# Patient Record
Sex: FEMALE | Race: WHITE
[De-identification: names, ages, dates, MRNs, and addresses within clinical notes are randomized per-mention and may not be internally consistent; named-entity substitution may affect disease eponyms.]

---

## 2020-08-08 ENCOUNTER — HOSPITAL ENCOUNTER (INPATIENT)
Dept: HOSPITAL 35 - ER | Age: 85
LOS: 2 days | Discharge: NURSING, REHAB OR LONG-TERM CARE | DRG: 65 | End: 2020-08-10
Attending: HOSPITALIST | Admitting: HOSPITALIST
Payer: COMMERCIAL

## 2020-08-08 VITALS — SYSTOLIC BLOOD PRESSURE: 171 MMHG | DIASTOLIC BLOOD PRESSURE: 67 MMHG

## 2020-08-08 VITALS — HEIGHT: 62.99 IN | BODY MASS INDEX: 24.36 KG/M2 | WEIGHT: 137.5 LBS

## 2020-08-08 VITALS — DIASTOLIC BLOOD PRESSURE: 102 MMHG | SYSTOLIC BLOOD PRESSURE: 142 MMHG

## 2020-08-08 VITALS — SYSTOLIC BLOOD PRESSURE: 170 MMHG | DIASTOLIC BLOOD PRESSURE: 81 MMHG

## 2020-08-08 DIAGNOSIS — M54.12: ICD-10-CM

## 2020-08-08 DIAGNOSIS — E44.1: ICD-10-CM

## 2020-08-08 DIAGNOSIS — F32.9: ICD-10-CM

## 2020-08-08 DIAGNOSIS — K59.00: ICD-10-CM

## 2020-08-08 DIAGNOSIS — Z79.899: ICD-10-CM

## 2020-08-08 DIAGNOSIS — Z60.2: ICD-10-CM

## 2020-08-08 DIAGNOSIS — G47.00: ICD-10-CM

## 2020-08-08 DIAGNOSIS — E78.5: ICD-10-CM

## 2020-08-08 DIAGNOSIS — M62.84: ICD-10-CM

## 2020-08-08 DIAGNOSIS — Z79.82: ICD-10-CM

## 2020-08-08 DIAGNOSIS — I10: ICD-10-CM

## 2020-08-08 DIAGNOSIS — G81.91: ICD-10-CM

## 2020-08-08 DIAGNOSIS — I63.81: Primary | ICD-10-CM

## 2020-08-08 DIAGNOSIS — E11.65: ICD-10-CM

## 2020-08-08 DIAGNOSIS — Z88.8: ICD-10-CM

## 2020-08-08 LAB
ALBUMIN SERPL-MCNC: 3.3 G/DL (ref 3.4–5)
ALT SERPL-CCNC: 19 U/L (ref 30–65)
ANION GAP SERPL CALC-SCNC: 7 MMOL/L (ref 7–16)
APTT BLD: 27.9 SECONDS (ref 24.5–32.8)
AST SERPL-CCNC: 15 U/L (ref 15–37)
BASOPHILS NFR BLD AUTO: 0.7 % (ref 0–2)
BILIRUB SERPL-MCNC: 0.3 MG/DL (ref 0.2–1)
BUN SERPL-MCNC: 23 MG/DL (ref 7–18)
CALCIUM SERPL-MCNC: 8.9 MG/DL (ref 8.5–10.1)
CHLORIDE SERPL-SCNC: 100 MMOL/L (ref 98–107)
CO2 SERPL-SCNC: 29 MMOL/L (ref 21–32)
CREAT SERPL-MCNC: 0.8 MG/DL (ref 0.6–1)
EOSINOPHIL NFR BLD: 6.9 % (ref 0–3)
ERYTHROCYTE [DISTWIDTH] IN BLOOD BY AUTOMATED COUNT: 13.8 % (ref 10.5–14.5)
GLUCOSE SERPL-MCNC: 173 MG/DL (ref 74–106)
GRANULOCYTES NFR BLD MANUAL: 62.6 % (ref 36–66)
HCT VFR BLD CALC: 32.7 % (ref 37–47)
HGB BLD-MCNC: 11.1 GM/DL (ref 12–15)
INR PPP: 1.1
LYMPHOCYTES NFR BLD AUTO: 19.7 % (ref 24–44)
MAGNESIUM SERPL-MCNC: 2.1 MG/DL (ref 1.8–2.4)
MCH RBC QN AUTO: 32.1 PG (ref 26–34)
MCHC RBC AUTO-ENTMCNC: 34 G/DL (ref 28–37)
MCV RBC: 94.6 FL (ref 80–100)
MONOCYTES NFR BLD: 10.1 % (ref 1–8)
NEUTROPHILS # BLD: 3.7 THOU/UL (ref 1.4–8.2)
PLATELET # BLD: 269 THOU/UL (ref 150–400)
POTASSIUM SERPL-SCNC: 4.1 MMOL/L (ref 3.5–5.1)
PROT SERPL-MCNC: 6.7 G/DL (ref 6.4–8.2)
PROTHROMBIN TIME: 10.9 SECONDS (ref 9.3–11.4)
RBC # BLD AUTO: 3.45 MIL/UL (ref 4.2–5)
SODIUM SERPL-SCNC: 136 MMOL/L (ref 136–145)
TROPONIN I SERPL-MCNC: <0.06 NG/ML (ref ?–0.06)
WBC # BLD AUTO: 5.9 THOU/UL (ref 4–11)

## 2020-08-08 PROCEDURE — 10081 I&D PILONIDAL CYST COMP: CPT

## 2020-08-08 SDOH — SOCIAL STABILITY - SOCIAL INSECURITY: PROBLEMS RELATED TO LIVING ALONE: Z60.2

## 2020-08-08 NOTE — HC
[facility]
Meredith [address]
[place]   [address]                     CONSULTATION                  
_______________________________________________________________________________
 
Name:       PAZ MONTGOMERY              Room #:         204-P       [facility] [place]  
M.R.#:      [id number]                       Account #:      [id number]  
Admission:  08/08/20    Attend Phys:    Josiah Soto MD    
Discharge:  08/10/20    Date of Birth:  [birth year]  
                                                          Report #: [id number]
                                                                    [id number]   
_______________________________________________________________________________
THIS REPORT FOR:  
 
cc:  Godfrey Mtz MD,Willy Perez MD, MD                                         ~
CC: Josiah Mtz
 
DATE OF SERVICE:  08/10/2020
 
 
HISTORY OF PRESENT ILLNESS:  The patient is an 88-year-old white female who was
admitted with right-sided weakness, noted to be recurrent.  She also had a fall
backwards with injury to the back of her head.  She was admitted and underwent
further evaluation.  Workup is consistent with an acute CVA with right-sided
weakness.  She was noted to have 4 small CVAs, left thalamic as well as adjacent
white matter.  A prior history is a history of a cervical spinal cord injury
when she apparently fell off a bridge 25 years ago and she does have some
resultant right-sided weakness.  Her course has now been complicated by some
further weakness from this new stroke.  She also underwent an MRI scan of her
cervical spine by Neurology and it did reveal some severe neural foraminal
stenosis at C5-C6.  She also has the old thinning of the cervical spinal cord at
C6-C7 with some changes suggestive of myelomalacia.  With the new CVA
complicating a prior cervical spinal cord injury with residual paresis, the
patient is now being seen in Rehabilitation Medicine consultation.
 
PAST MEDICAL HISTORY:  Includes the prior cervical spinal cord injury.  She has
a history of back pain, depression, diabetes mellitus, and hypertension.
 
ALLERGIES:  MEPERIDINE.
 
MEDICATIONS:  Please see the full medication listing.
 
HABITS:  No history of tobacco abuse, alcohol only on special occasions.
 
SOCIAL HISTORY:  She has been living in an independent living apartment with her
caregiver.  The person was not really a caregiver, but more of a sitter.  The
daughter notes that she did assist with some bathing and also trying to ensure
that the patient did not fall.  The daughter indicates that the prior sitter is
not an option and they will need to get a different sitter or consider having
the patient move into an assisted living facility.  [facility]
 
REVIEW OF SYSTEMS:  No current complaints of chest pain, shortness of breath or
abdominal discomfort.
 
 
 
 
[address]   [address]                     CONSULTATION                  
_______________________________________________________________________________
 
Name:       PAZ MONTGOMERY              Room #:         204-P       [facility] [place]  
..#:      [id number]                       Account #:      [id number]  
Admission:  08/08/20    Attend Phys:    Josiah Soto MD    
Discharge:  08/10/20    Date of Birth:  [birth year]  
                                                          Report #: [id number]
                                                                    [id number]   
_______________________________________________________________________________
PHYSICAL EXAMINATION:
GENERAL:  An 88-year-old pleasant white female, in no obvious distress.  She is
here with her daughter in the room.  The patient is alert.
VITAL SIGNS:  Temperature 98.3, pulse 88, respirations 17, blood pressure
142/73.
HEENT:  Appeared to be benign.
NEUROLOGIC:  Cranial nerves are grossly intact.  Facies appeared symmetric. 
There is slight slurring of her speech, but overall can communicate quite well. 
Tends to defer some answers to her daughter.
EXTREMITIES:  She does have some decreased coordination of that right upper
extremity.  Strength is probably a grade 4-/5.  Right lower extremity appears
weaker, probably a grade 3+ with some difficulty with hip flexion and knee
extension, but this is noted to be old with a prior spinal cord injury.  Tone
was actually reasonably intact.  There was no clonus at the ankle and no
significant DTR at the knee.  Sensation appeared reasonably intact to
simultaneous stimulation except for some decreased sensation of her right third
digit, which she thought might be worse than premorbid.  Functionally, she has
been min assist sit to stand and gait short distances is min assist with a
front-wheeled walker.
 
ASSESSMENT:  An 88-year-old white female with the following problem list:
1.  Multiple small cerebrovascular accidents in the left cerebral hemisphere.
2.  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
3.  Right-sided weakness, some appears to be premorbid, now complicated by the
new strokes.
4.  Functional mobility and activities of daily living deficits and cognitive
concerns.
5.  Diabetes mellitus.
6.  Hypertension.
7.  Depression.
 
PLAN:  The patient is a candidate for an acute [address] [place] [address] inpatient rehabilitation
stay.  Can plan on transfer when medically cleared and a bed available.
 
Thank you for asking us to assist in this patient's care.
 
 
 
 
 
 
 
 
 
                         
   By:                               
                   
D: 08/10/20 1342                           _____________________________________
T: 08/10/20 2221                           Willy Shore MD           /SABRINA

## 2020-08-08 NOTE — HC
[facility]
Meredith [address]
[place]   [address]                     CONSULTATION                  
_______________________________________________________________________________
 
Name:       PAZ MONTGOMERY              Room #:         204-P       [facility] [place]  
M.R.#:      [id number]                       Account #:      [id number]  
Admission:  08/08/20    Attend Phys:    Josiah Soto MD    
Discharge:              Date of Birth:  [birth year]  
                                                          Report #: [id number]
                                                                    [id number]   
_______________________________________________________________________________
THIS REPORT FOR:  
 
cc:  Godfrey Mtz MD,Godfrey Gutierrez,Jeff SPANGLER MD                                         ~
CC: Josiah Mtz
 
DATE OF SERVICE:  08/08/2020
 
 
HISTORY OF PRESENT ILLNESS:  This is an 88-year-old female patient who was
evaluated by me for the possibility of stroke.  The patient was discussed with
the Emergency Room physician and subsequently I talked to the patient's daughter
multiple times and talked to the patient.  I talked to MRI and we got the MRI
done and I reviewed the MRI report and the films.
 
The patient was in [facility] on Tuesday with what looks like an
episode of speech difficulty and right-sided weakness from which she became
better.  She may not have returned completely back to the baseline, but overall
she was improved.  She had another episode yesterday and had another episode
today and in fact she may have had the fourth episode.  She is not completely
back to the baseline, but is close to that.  MRI films reviewed indicate that
she had an acute infarct.  There are small infarcts in the left cerebral
hemisphere, which will correlate with the patient's symptoms.  She had a slight
fall, but does not look like she injured her spine much, but she had an old
spine injury and so I got an MRI of the C-spine done and that shows
myelomalacia, which would be consistent with injury.  It complicates the things
because the patient has a preexisting weakness on the right side that is because
of spinal cord injury and now is having symptoms on the same side.
 
REVIEW OF SYSTEMS:  She is on multiple medications like Lexapro and
amitriptyline.  She is on aspirin.  She had some history of falls.  There had
been some concern about giving her blood thinner because of the falls.  She
still lives in independent living.
 
A 14-point review of systems is positive for above.
 
PAST MEDICAL HISTORY:  Positive for spinal cord injury long time ago.
 
FAMILY HISTORY:  Unremarkable.
 
SOCIAL HISTORY:  She is in assisted living and she has a very supporting
daughter.
 
PHYSICAL EXAMINATION:  She is alert, responsive, able to follow simple and
 
 
 
[facility]
[address]
[place]   [address]                     CONSULTATION                  
_______________________________________________________________________________
 
Name:       PAZ MONTGOMERY              Room #:         204-P       [facility] [place]  
.R.#:      [id number]                       Account #:      [id number]  
Admission:  08/08/20    Attend Phys:    Josiah Soto MD    
Discharge:              Date of Birth:  [birth year]  
                                                          Report #: [id number]
                                                                    [id number]   
_______________________________________________________________________________
complex command.  Her speech reasonably looked good now, but it was slurred
earlier.  She is weak on the right side, especially in the right leg, she barely
moves, but apparently that is old, but she can still appreciate the position
sense on both sides.  There is no meningeal sign.  Cardiac examination appears
unremarkable.  No respiratory difficulty was noticed.  I reviewed all her MRI
films and discussed the situation with the daughter and in fact showed her the
films.
 
IMPRESSION:
1.  Multiple small cerebrovascular accidents because of small vessel disease in
the left cerebral hemisphere.
2.  Spinal cord myelomalacia, but that is most likely old, although the patient
may be getting worse from that.
 
RECOMMENDATION:  I discussed the situation with the patient and the daughter.  I
discussed with them that the large vessel looks good and the stroke is most
likely because of small vessel disease.  Unfortunately, not much can be done
about that except antiplatelet therapy.  I discussed with them that it is the
nature of the stroke that they fluctuate for up to several days and can become
worse for several days and sometime the patient becomes completely paralyzed
before they start improving.  We have given her a loading dose of Plavix.  We
will continue about with a combination of aspirin and Plavix.  Daughter tells me
that she has a history of fall.  That does complicate the things because blood
thinner does increase her chances of complication from the fall as well as for
bleeding.  So after discussing things with her, my recommendation was to
continue combination of aspirin and Plavix when she is in the hospital and she
will have more help available.  Hopefully, after that she can go to rehab,
especially with her history of fall, I hope she will qualify because she needs
to be closely monitored and she came from independent living and falls can be
catastrophic in this patient and on the basis of that, I hope she will qualify
for rehabilitation.  If not, then I think we have to consider skilled nursing
facility because I do not think she should go to independent living for the time
being because of the history of the fall and I hope on the basis of that, she
will qualify to go to rehab or skilled nursing facility.  We will put a PT, OT
consult and a  consult to see if she qualifies.  We need to keep her
blood pressure somewhat high for a permissive hypertension and we need to give
her some fluids.  If she deteriorates, we will give her more fluids.  We will
get an echocardiogram done and as an outpatient, she may need prolonged
monitoring to look for atrial fibrillation.  If we find any atrial fibrillation
when she is on the monitor here, then I do not think she will need that
prolonged monitoring.  Those things will be decided next week.  Presently, I
think it is best to keep her in the hospital, give her a combination of aspirin
and Plavix, nurses should help her to prevent any falls and hopefully she will
qualify for the rehab and can go and spend some time in rehabilitation until she
can come back to her baseline and long-term use of aspirin and Plavix will be
decided later on in conjunction with discussion with the daughter and the
 
 
 
[facility]
[address]
[place]   [address]                     CONSULTATION                  
_______________________________________________________________________________
 
Name:       PAZ MONTGOMERY              Room #:         204-P       [facility] [place]  
..#:      [id number]                       Account #:      [id number]  
Admission:  08/08/20    Attend Phys:    Josiah Soto MD    
Discharge:              Date of Birth:  [birth year]  
                                                          Report #: [id number]
                                                                    [id number]   
_______________________________________________________________________________
patient herself.  After talking to daughter, I also talked to the patient and
described all those things with her and she is agreeable with that.
 
Approximately 50 minutes of time was spent taking care of this patient today and
majority of that time was spent counseling the daughter, the patient, reviewing
her films, reviewing her imaging studies of the brain.
 
Thank you very much for this referral and if you have any question, please feel
free to contact me.
 
 
 
 
 
 
 
 
 
 
 
 
 
 
 
 
 
 
 
 
 
 
 
 
 
 
 
 
 
 
 
 
 
 
 
                         
   By:                               
                   
D: 08/08/20 1548                           _____________________________________
T: 08/08/20 1714                           Jeff Gutierrez MD           /nt

## 2020-08-08 NOTE — NUR
PATIENT ARRIVED FROM ED VIA W/C, ALERT AND ORIENTED X4, ACCOMPANIED BY HER
DAUGHTER AND ED TECH.
ASSESSMENT AS DOCUMENTED, AND ADMISION COMPLETED.
/102, OTHER VSS AND NIHSS 0.
AND WILL CONTINUE WITH POC.

## 2020-08-09 VITALS — DIASTOLIC BLOOD PRESSURE: 83 MMHG | SYSTOLIC BLOOD PRESSURE: 162 MMHG

## 2020-08-09 VITALS — SYSTOLIC BLOOD PRESSURE: 153 MMHG | DIASTOLIC BLOOD PRESSURE: 86 MMHG

## 2020-08-09 VITALS — SYSTOLIC BLOOD PRESSURE: 157 MMHG | DIASTOLIC BLOOD PRESSURE: 76 MMHG

## 2020-08-09 VITALS — SYSTOLIC BLOOD PRESSURE: 147 MMHG | DIASTOLIC BLOOD PRESSURE: 77 MMHG

## 2020-08-09 VITALS — DIASTOLIC BLOOD PRESSURE: 80 MMHG | SYSTOLIC BLOOD PRESSURE: 165 MMHG

## 2020-08-09 LAB
ANION GAP SERPL CALC-SCNC: 9 MMOL/L (ref 7–16)
BILIRUB UR-MCNC: NEGATIVE MG/DL
BUN SERPL-MCNC: 16 MG/DL (ref 7–18)
CALCIUM SERPL-MCNC: 8.7 MG/DL (ref 8.5–10.1)
CHLORIDE SERPL-SCNC: 100 MMOL/L (ref 98–107)
CO2 SERPL-SCNC: 26 MMOL/L (ref 21–32)
COLOR UR: YELLOW
CREAT SERPL-MCNC: 0.8 MG/DL (ref 0.6–1)
ERYTHROCYTE [DISTWIDTH] IN BLOOD BY AUTOMATED COUNT: 14.1 % (ref 10.5–14.5)
GLUCOSE SERPL-MCNC: 142 MG/DL (ref 74–106)
HCT VFR BLD CALC: 33.7 % (ref 37–47)
HGB BLD-MCNC: 11.5 GM/DL (ref 12–15)
KETONES UR STRIP-MCNC: NEGATIVE MG/DL
MCH RBC QN AUTO: 32.3 PG (ref 26–34)
MCHC RBC AUTO-ENTMCNC: 34 G/DL (ref 28–37)
MCV RBC: 94.9 FL (ref 80–100)
PLATELET # BLD: 281 THOU/UL (ref 150–400)
POTASSIUM SERPL-SCNC: 3.9 MMOL/L (ref 3.5–5.1)
RBC # BLD AUTO: 3.55 MIL/UL (ref 4.2–5)
RBC # UR STRIP: NEGATIVE /UL
SODIUM SERPL-SCNC: 135 MMOL/L (ref 136–145)
SP GR UR STRIP: 1.01 (ref 1–1.03)
URINE CLARITY: CLEAR
URINE GLUCOSE-RANDOM*: NEGATIVE
URINE LEUKOCYTES-REFLEX: (no result)
URINE NITRITE-REFLEX: NEGATIVE
URINE PROTEIN (DIPSTICK): NEGATIVE
UROBILINOGEN UR STRIP-ACNC: 0.2 E.U./DL (ref 0.2–1)
WBC # BLD AUTO: 6.4 THOU/UL (ref 4–11)

## 2020-08-09 NOTE — NUR
ASSESSMENT AS DOCUMENTED.PT BEEN RESTING IN NO ACUTE DISTRESS.A/OX4.VSS.PT
REPORTED THAT SHE NOTED BLOOD IN HER COMMOND AFTER USING THE BSC,RN
CHECKED NO SIGNS OF BLEEDING.RECTAL AREA WAS INSPECTED,NO SIGN OF BLOOD OR
ACTIVE BLEEDING NOTED.DR PHILIP DISCONTINUED LOVENOX AND ADVISED TO MONITOR FOR
BLEEDING.NO DROP IN HGB NOTED.UP WITH ASSIST TO TOILET.PT DENIES PAIN OR ANY
DISTRESS AT THIS TIME.POC IS TO CONT WITH TX WITH POSSIBLE DISCHARGE TO REHAB
IN 1-2 DAYS.

## 2020-08-09 NOTE — NUR
ASSUMED CARE AT SHIFT, ALERT AND ORIENTED X4. -167/76-80, OTHER VSS AND
NSR ON THE MONITOR. DENIES NAY DISCOMFORT. CALL APPROPRIATLY FOR ASSISTANCE.
PROGRESSING TOWARDS THE GOALS AND WILL CONTINUE WITH POC.

## 2020-08-10 ENCOUNTER — HOSPITAL ENCOUNTER (INPATIENT)
Dept: HOSPITAL 35 - REHABU | Age: 85
LOS: 15 days | Discharge: HOME HEALTH | DRG: 56 | End: 2020-08-25
Attending: PHYSICAL MEDICINE & REHABILITATION | Admitting: PHYSICAL MEDICINE & REHABILITATION
Payer: COMMERCIAL

## 2020-08-10 VITALS — DIASTOLIC BLOOD PRESSURE: 74 MMHG | SYSTOLIC BLOOD PRESSURE: 158 MMHG

## 2020-08-10 VITALS — DIASTOLIC BLOOD PRESSURE: 74 MMHG | SYSTOLIC BLOOD PRESSURE: 154 MMHG

## 2020-08-10 VITALS — SYSTOLIC BLOOD PRESSURE: 142 MMHG | DIASTOLIC BLOOD PRESSURE: 73 MMHG

## 2020-08-10 VITALS — BODY MASS INDEX: 19.61 KG/M2 | HEIGHT: 62.99 IN | WEIGHT: 110.7 LBS

## 2020-08-10 VITALS — SYSTOLIC BLOOD PRESSURE: 145 MMHG | DIASTOLIC BLOOD PRESSURE: 90 MMHG

## 2020-08-10 VITALS — SYSTOLIC BLOOD PRESSURE: 166 MMHG | DIASTOLIC BLOOD PRESSURE: 86 MMHG

## 2020-08-10 DIAGNOSIS — Z20.828: ICD-10-CM

## 2020-08-10 DIAGNOSIS — I63.9: ICD-10-CM

## 2020-08-10 DIAGNOSIS — G95.89: ICD-10-CM

## 2020-08-10 DIAGNOSIS — Z79.899: ICD-10-CM

## 2020-08-10 DIAGNOSIS — F32.9: ICD-10-CM

## 2020-08-10 DIAGNOSIS — I69.351: Primary | ICD-10-CM

## 2020-08-10 DIAGNOSIS — K59.00: ICD-10-CM

## 2020-08-10 DIAGNOSIS — E11.9: ICD-10-CM

## 2020-08-10 DIAGNOSIS — Z79.82: ICD-10-CM

## 2020-08-10 DIAGNOSIS — R41.0: ICD-10-CM

## 2020-08-10 DIAGNOSIS — R41.9: ICD-10-CM

## 2020-08-10 DIAGNOSIS — I10: ICD-10-CM

## 2020-08-10 DIAGNOSIS — Z88.8: ICD-10-CM

## 2020-08-10 LAB
CHOLEST SERPL-MCNC: 197 MG/DL (ref ?–200)
HDLC SERPL-MCNC: 59 MG/DL (ref 40–?)
LDLC SERPL-MCNC: 123 MG/DL (ref ?–100)
TC:HDL: 3.3 RATIO
TRIGL SERPL-MCNC: 78 MG/DL (ref ?–150)
VLDLC SERPL CALC-MCNC: 16 MG/DL (ref ?–40)

## 2020-08-10 PROCEDURE — 10112: CPT

## 2020-08-10 NOTE — EKG
[facility]
[place]   [address]                     ELECTROCARDIOGRAM REPORT      
_______________________________________________________________________________
 
Name:       PAZ MONTGOMERY              Room #:         204-P       [facility] [place]  
M.R.#:      [id number]                       Account #:      [id number]  
Admission:  20    Attend Phys:    Josiah Soto MD    
Discharge:              Date of Birth:  [birth year]  
                                                          Report #: [id number]
                                                                    [id number]
_______________________________________________________________________________
THIS REPORT FOR:  
 
cc:  Godfrey Mtz MD, Christopher MD Lundgren,Dante RHOADES MD [facility]                                        ~
THIS REPORT FOR:   //name//                          
 
                         [facility] ED
                                       
Test Date:    2020               Test Time:    11:31:06
Pat Name:     PAZ MONTGOMERY         Department:   
Patient ID:   [id number]            Room:         [facility] [place]
Gender:       F                        Technician:   fa
:          [id number]               Requested By: Luisito Akins
Order Number: [id number] MD:   Dante Camejo
                                 Measurements
Intervals                              Axis          
Rate:         70                       P:            72
HI:           208                      QRS:          18
QRSD:         106                      T:            20
QT:           408                                    
QTc:          441                                    
                           Interpretive Statements
Sinus rhythm
Septal infarct, age indeterminant
No previous ECG available for comparison
Electronically Signed On 8- 8:13:59 CDT by Dante Camejo
[email or web address]
 
 
 
 
 
 
 
 
 
 
 
 
 
 
 
 
  <ELECTRONICALLY SIGNED>
   By: Dante Camejo MD, FAC   
  08/10/20     08
D: 20 1131                           _____________________________________
T: 20 1131                           Dante Camejo MD, [facility]     /EPI

## 2020-08-10 NOTE — NUR
chart review. dino visited with natalia via phone call prior to move to  acute
rehab. intro to cm, team meeting, and dcp. pt is able to make her needs know
and is pleasant. she reported " live at independent living [facility].
1st floor apartment. manage own medication. no longer drive. can get meals at
[facility]. laundry room is in apartment. shower myself but someone is in
the bathroom when i bathe. have rollator, shower chair, grab bars, bedside
commode and life alert. have care giver that only assist with meals, errands,
cleaning and laundry 5 days week. [facility]. was supposed to have [facility] start this week.  daughter franc
would be my support if needed. pcp dr bijan alvares"/natalia.  will cont
following as needed for dc needs.

## 2020-08-10 NOTE — NUR
ASSUMED CARE AT SHIFT CAHNGE ALERT AND ORIENTED, AND VSS. PATIENT DENIES ANY
DISCOMFORT, DAUGHTER AT BEDSIDE VISITING.
PLAN IS FOR PATIENT TO TRANSFER TO [facility], WAITING ON BED AND WILL CONTINUE WITH
POC.

## 2020-08-10 NOTE — NUR
[facility] CONSULT RECEIVED FOR THIS PATIENT. Pt SEEN THIS AM BY DR. BLEVINS. Pt IS
GOOD CANDIDATE FOR ACUTE REHAB. PER DR. OSUNA, Pt MEDICALLY READY FOR D/C TO
REHAB TODAY. WILL PLAN TO ADMIT Pt TO [facility] REHAB THIS AFTERNOON. SPOKE WITH Pt
ABOUT REHAB AND LEFT BROCHURE AT BEDSIDE FOR Pt AND DTR.

## 2020-08-10 NOTE — NUR
NO EVENTS OVERNIGHT. PT ALERT AND ORIENTED.  VSS. DENIES PAIN, CHEST
DISCOMFORT, OR NAUSEA AND VOMITING.  ASSIST X 1 TO THE BEDSIDE COMMODE.  NIH
SCORE 2.  PT  COMPLAINS OF INCREASED RIGHT LEG WEAKNESS FOR A FEW DAYS NOW.
NO OTHER CONCERNS. WILL CONTINUE TO MONITOR.

## 2020-08-10 NOTE — NUR
met with patient who resides in independent living at [facility], dtr at
bedside.  She has a "sitter" per dtr 5 days a week for 8 hours a day, Sitter
does not provide hands on care assist with laundry and meals.  On weekend she
has assistance with meals. Patient uses a walker for ambulation.  Dtr reports
she does not ambulate very far. She has a wc but does not use.  She has
commode at bedside. Patient has been at [facility] and [facility]. She
is to be evaled by [facility] today. patient agreeable to acute rehab.

## 2020-08-10 NOTE — 2DMMODE
[facility]
[address] [place]   [address]                   2 D/M-MODE ECHOCARDIOGRAM     
_______________________________________________________________________________
 
Name:       PAZ MONTGOMERY              Room #:         204-P       ADM IN  
M.R.#:      [id number]                       Account #:      [id number]  
Admission:  20    Attend Phys:    Josiah Soto MD    
Discharge:              Date of Birth:  [birth year]  
                                                          Report #: [id number]
                                                                    [id number]
_______________________________________________________________________________
THIS REPORT FOR:  
 
cc:  Godfrey Mtz MD, Christopher MD Lammoglia, Francisco J. MD                                        
                                                                       ~
 
--------------- APPROVED REPORT --------------
 
 
Study performed:  08/10/2020 09:28:08
 
EXAM: Comprehensive 2D, Doppler, and color-flow 
Echocardiogram 
Patient Location: Bedside   
Room #:  204     Status:  routine
 
      BSA:         1.63
HR: 85 bpm BP:          145/90 mmHg 
Rhythm: NSR     
 
Other Information 
Study Quality: Good
 
Indications
CVA/TIA 
Diabetes
Hypertension/HDD
 
Echo Enhancing Agent
Indication: Rule out Shunt
Agent(s) / Amount(s) Used: Agitated Saline 7 cc
 
2D Dimensions
 IVC:  11.00 mm
 
Volumes
Left Atrial Volume (Systole) 
Single Plane 4CH:  33.72 mL Single Plane 2CH:  25.68 mL
    LA ESV Index:  22.00 mL/m2
 
Aortic Valve
AoV Peak Kayode.:  1.52 m/s 
AO Peak Gr.:  9.20 mmHg  LVOT Max P.36 mmHg
    LVOT Max V:  1.04 m/s
 
 
 
[facility]
[address] [facility]
[place]  [address]
Phone:  [phone] 2 D/M-MODE ECHOCARDIOGRAM     
_______________________________________________________________________________
 
Name:            PAZ MONTGOMERY              Room #:        204-P       ADM IN
M.R.#:           [id number]          Account #:     [id number]  
Admission:       20         Attend Phys:   Josiah Soto MD
Discharge:                  Date of Birth: [birth year]  
                         Report #:      [id number]
        [id number]
_______________________________________________________________________________
Pulmonary Valve
PV Peak Kayode.:  0.90 m/s PV Peak Gr.:  3.27 mmHg
 
Left Ventricle
The left ventricle is normal size. There is normal LV segmental wall 
motion. There is normal left ventricular wall thickness. The left 
ventricular systolic function is normal. The left ventricular 
ejection fraction is within the normal range. LVEF is 55-60%. Grade I 
- abnormal relaxation pattern.
 
Right Ventricle
The right ventricle is normal size. The right ventricular systolic 
function is normal.
 
Atria
The left atrium size is normal. PFO is noted with agitated saline 
injection. The right atrium size is normal.
 
Aortic Valve
The aortic valve is normal in structure. No aortic regurgitation is 
present. There is no aortic valvular stenosis.
 
Mitral Valve
The mitral valve is normal in structure. Trace mitral regurgitation. 
No evidence of mitral valve stenosis.
 
Tricuspid Valve
The tricuspid valve is normal in structure. There is no tricuspid 
valve regurgitation noted.
 
Pulmonic Valve
The pulmonary valve is normal in structure. There is no pulmonic 
valvular regurgitation.
 
Great Vessels
The aortic root is normal in size. IVC is normal in size and 
collapses >50% with inspiration.
 
Pericardium
There is no pericardial effusion.
 
<Conclusion>
The left ventricle is normal size.
LVEF is 55-60%.
The left atrium size is normal.
The right atrium size is normal.
 
 
[facility]
[address]
[place]  [address]
Phone:  [phone] 2 D/M-MODE ECHOCARDIOGRAM     
_______________________________________________________________________________
 
Name:            PAZ MONTGOMERY              Room #:        204-P       [facility] [place]
.R.#:           [id number]          Account #:     [id number]  
Admission:       20         Attend Phys:   Josiah Soto MD
Discharge:                  Date of Birth: [birth year]  
                         Report #:      [id number]
        [id number]
_______________________________________________________________________________
The aortic valve is normal in structure.
The mitral valve is normal in structure.
Trace mitral regurgitation.
The tricuspid valve is normal in structure.
The pulmonary valve is normal in structure.
There is no pericardial effusion.
 PFO is noted with agitated saline injection.
 
 
 
 
 
 
 
 
 
 
 
 
 
 
 
 
 
 
 
 
 
 
 
 
 
 
 
 
 
 
 
 
 
 
 
 
 
  <ELECTRONICALLY SIGNED>
   By: Bridger Smith MD    
  08/10/20     1124
D: 08/10/20 1124                           _____________________________________
T: 08/10/20 1124                           Bridger Smith MD      /INF

## 2020-08-11 VITALS — DIASTOLIC BLOOD PRESSURE: 80 MMHG | SYSTOLIC BLOOD PRESSURE: 144 MMHG

## 2020-08-11 LAB
ANION GAP SERPL CALC-SCNC: 9 MMOL/L (ref 7–16)
BUN SERPL-MCNC: 21 MG/DL (ref 7–18)
CALCIUM SERPL-MCNC: 8.6 MG/DL (ref 8.5–10.1)
CHLORIDE SERPL-SCNC: 99 MMOL/L (ref 98–107)
CO2 SERPL-SCNC: 27 MMOL/L (ref 21–32)
CREAT SERPL-MCNC: 0.8 MG/DL (ref 0.6–1)
ERYTHROCYTE [DISTWIDTH] IN BLOOD BY AUTOMATED COUNT: 13.9 % (ref 10.5–14.5)
GLUCOSE SERPL-MCNC: 196 MG/DL (ref 74–106)
HCT VFR BLD CALC: 33.8 % (ref 37–47)
HGB BLD-MCNC: 11.5 GM/DL (ref 12–15)
MCH RBC QN AUTO: 32.3 PG (ref 26–34)
MCHC RBC AUTO-ENTMCNC: 34.1 G/DL (ref 28–37)
MCV RBC: 94.8 FL (ref 80–100)
PLATELET # BLD: 273 THOU/UL (ref 150–400)
POTASSIUM SERPL-SCNC: 4 MMOL/L (ref 3.5–5.1)
RBC # BLD AUTO: 3.56 MIL/UL (ref 4.2–5)
SODIUM SERPL-SCNC: 135 MMOL/L (ref 136–145)
WBC # BLD AUTO: 5.6 THOU/UL (ref 4–11)

## 2020-08-11 NOTE — NUR
PLEASANT PATIENT IS ABLE TO WALK TO TOILET, BUT HAS HIP PAIN AND DRAGS RIGHT
LEG ENOUGH THAT SHE WANTS TO USE BSC NEXT TRIP. SIGNIFICANT CASE OF USI WHICH
SHE MANAGES WITH BRIEF AND PAD. LOOKS FORWARD TO WORKING WITH THERAPY TODAY AS
SHE HAS RIGHT LEG WEAKNESS COMPLICATING PREVIOUS RIGHT LEG FOOT DRAG/DROP.
PREDICTS THAT SHE WILL BE MVING FROM INDEPENDENT LIVING TO ASSISTED LIVING IN
THE NEAR FUTURE

## 2020-08-11 NOTE — NUR
ASSUMED CARE AT 0700 TODAY. PT. IS MIN ASSIST WITH STANDING, GOING TO
BATHROOM, GETTING INTO AND OUT OF BED.  SHE WEARS BRIEFS FOR UA STRESS
INCONTINENCE.  DIABETIC PROTOCOL ORDERED TODAY.  SHE TOOK HER MEDS AND ATE
WITHOUT PROBLEMS NOTED.  SHE IS A&OX4 BUT A BIT FORGETFUL.  SHE HAS A BEDSIDE
COMMODE.  THE HAS SOME HIP/LEG WEAKNESS.

## 2020-08-12 VITALS — DIASTOLIC BLOOD PRESSURE: 65 MMHG | SYSTOLIC BLOOD PRESSURE: 121 MMHG

## 2020-08-12 VITALS — SYSTOLIC BLOOD PRESSURE: 129 MMHG | DIASTOLIC BLOOD PRESSURE: 68 MMHG

## 2020-08-12 LAB
EST. AVERAGE GLUCOSE BLD GHB EST-MCNC: 177 MG/DL
GLYCOHEMOGLOBIN (HGB A1C): 7.8 % (ref 4.8–5.6)

## 2020-08-12 NOTE — NUR
PT ALERT AND ORIENTED X 4, FORGETFUL.  RIGHT SIDED WEAKNESS NOTED.  DRAGS
RIGHT FOOT WHEN UP.  BLOOD SUGAR 205 AT HS.  METFORMIN GIVEN AS ORDERED.  PT
DENIES PAIN OR DISCOMFORT.  BED ALARM ON FOR SAFETY.  PT APPEARS TO BE
SLEEPING ON HOURLY ROUNDS.

## 2020-08-12 NOTE — NUR
ASSUMED CARE AT 0700. PATIENT IS ALERT AND ORIENTED X4. PATIENT HERNANDEZ, PATIENT
HAS RIGHT SIDED WEAKNESS. LUNGS ARE CLEAR. ABD IS SOFT WITH BSX4.  PATIENT IS
UP TO THE BSC WITH ASSIST OF 1 STAFF. PATIENT C/O CONSTIPATION. PATIENT HAS
STRESS INCONTINENCE AT TIMES.  PATIENT HAS S.L. IN HER RIGHT UPPER ARM. FALL
AND SAFETY PROTOCOLS IN PLACE. C/O ARTHRITIC PAIN HER HANDS. MEDICATED WITH
PRN PAIN MED. CONTINUES TO PROGRESS TOWARDS D/C GOALS. WILL CONTINUE TO
MONITER.

## 2020-08-12 NOTE — NUR
pt daughter karl here for visit. cm visited with daughter at bedside. cm cont
to wear own face mask and goggles during visit. natalia getting ready to eat
lunch. education with pt and daughter on weekly team meeting, recommendation "
ok just need to know if she going to need allot of assistance or little will
determine if [place] goes back [place] or if need to look into [place]. i will be going
out of town to take daughter to school but i am the contact and going to have
a back up visitor since i wont be here"/karl.

## 2020-08-13 VITALS — SYSTOLIC BLOOD PRESSURE: 140 MMHG | DIASTOLIC BLOOD PRESSURE: 57 MMHG

## 2020-08-13 VITALS — SYSTOLIC BLOOD PRESSURE: 140 MMHG | DIASTOLIC BLOOD PRESSURE: 69 MMHG

## 2020-08-13 NOTE — NUR
ASSUMED CARE AT 0700. PATIENT IS ALERT AND ORIENTED X4. PATIENT HAS RIGHT
SIDED WEAKNESS. LUNGS ARE CLEAR . ABD IS SOFT WITH BSX4. PATIENT IS STILL
HAVING  CONSTIPATION ISSUES. LAXATIVES GIVEN AS ORDERED. PRUNE JUICE GIVEN AS
REQUESTED BY PATIENT. UP IN W/C FOR BREAKFAST. FALL AND SAFETY PROTOCOLS IN
PLACE.  PATIENT STATES JUST ANXIOUS ABOUT ALL THE THERAPY. 02 SAT 98%. VSS.
CONTINUES TO PROGRESS SLOWLY TOWARDS D/C GOALS. WILL CONTINUE TO MONITER.

## 2020-08-13 NOTE — NUR
cm received phone call from daughter karl knox, just want an update on her
mom, passed on having come constipation and anxious with therapy " yes she is
getting so tired and worn out with all therapy. is there a way they can spilt
it up or spread it out more to allow her to rest?"/karl. education that would
pass on information to therapy " thank you, will check on her tomorrow"/
daughter.

## 2020-08-13 NOTE — NUR
PATIENT HAS BEEN A/0X4. SHE HAD BISCADOYL SUPPOSITORY FOR CONSTIPATION AROUND
2200. SHE HAD INCREASED CRAMPING AND GAS PAINS. PT GIVEN TYLENOL 650 WITH SOME
RELIEF. PATIENT HAD MODERATE SIZE BM THAT WAS HARD AND HAD TO BE HELPED TO
PASS OF OF RECTUM. PATIENT BOWEL SOUNDS POSITIVE X 4 AND ACTIVE. PATIENT SAT
UP ON BSC FOR AWHILE AND THEN BACK TO BED. PATIENT TOOK HER MEDS WHOLE WITH
WATER. SHE DID NOT WANT HER MIRALAX TONIGHT BUT DID HAVE HER DOCUSATE NA AND
SENNA.  PATIENT IS ASSIST X 1 TO BS. PATIENT IS SLEEPING AT THIS TIME. BED IN
LOW POSITON AND BED ALARM IS ON. CONTINUING TO MONITOR.

## 2020-08-14 VITALS — DIASTOLIC BLOOD PRESSURE: 69 MMHG | SYSTOLIC BLOOD PRESSURE: 172 MMHG

## 2020-08-14 VITALS — DIASTOLIC BLOOD PRESSURE: 57 MMHG | SYSTOLIC BLOOD PRESSURE: 135 MMHG

## 2020-08-14 VITALS — SYSTOLIC BLOOD PRESSURE: 149 MMHG | DIASTOLIC BLOOD PRESSURE: 77 MMHG

## 2020-08-14 LAB
BILIRUB UR-MCNC: NEGATIVE MG/DL
COLOR UR: YELLOW
KETONES UR STRIP-MCNC: (no result) MG/DL
RBC # UR STRIP: NEGATIVE /UL
SP GR UR STRIP: 1.01 (ref 1–1.03)
URINE CLARITY: CLEAR
URINE GLUCOSE-RANDOM*: NEGATIVE
URINE LEUKOCYTES-REFLEX: NEGATIVE
URINE NITRITE-REFLEX: NEGATIVE
URINE PROTEIN (DIPSTICK): NEGATIVE
UROBILINOGEN UR STRIP-ACNC: 0.2 E.U./DL (ref 0.2–1)

## 2020-08-14 NOTE — NUR
UP TO BSC WITH 1P ASSIST FOR BM. TYLENOL FOR SUDDEN SHOULDER PAIN. A FEW GULPS
OF WATER WITH ENCOURAGEMENT. PATIENT IS AWARE THAT THERAPIES WILL SPREAD OUT
HER THERAPIES TODAY DUE TO HER FATIGUE AND EXHAUSTION YESTERDAY.

## 2020-08-14 NOTE — NUR
ASSUMED CARES AT 0700. PT AWAKE, ALERT AND ORIENTED* 4 BUT FORGETFUL. C/O SOB
AND FATIGUE AFTER SPEECH THERAPY THIS AM, STATED, " THIS FEELS LIKE IF FELT
WHEN I HAD STROKE". VITALS TAKEN AND ASSESSMENT DONE, STABLE. HOSPITALIST
NOTIFIED. PT RESTED IN THE RECLINER AND HAD HER LUNCH, FEELING BETTER AFTER
LUNCH AND WAS ABLE TO TOLERATE PHYSICAL THERAPY BETTER THAN YESTERDAY PER
PHYSICAL THERAPIST. CONTINUES TO HAVE RIGHT SIDED WEAKNESS, DRAGGING R FOOT
WITH AMBULATION. Q1H VISUAL CHECKS. CALL LIGHT WITHIN REACH. FALL PRECAUTIONS
IN PLACE

## 2020-08-15 VITALS — SYSTOLIC BLOOD PRESSURE: 150 MMHG | DIASTOLIC BLOOD PRESSURE: 66 MMHG

## 2020-08-15 VITALS — SYSTOLIC BLOOD PRESSURE: 147 MMHG | DIASTOLIC BLOOD PRESSURE: 65 MMHG

## 2020-08-15 VITALS — DIASTOLIC BLOOD PRESSURE: 81 MMHG | SYSTOLIC BLOOD PRESSURE: 173 MMHG

## 2020-08-15 NOTE — NUR
ASSUMED CARES AT 0700. PT ORIENTED TO PERSON, PLACE AND SITUATION. FATIGUED
AND LETHARGIC AFTER ST THIS AM, VITALS CHECKED REMAIN STABLE. PT SLEPT FOR
30MIN WOKE UP AND WAS ALERT AND WELL ORIENTED. PARTICIPATED WELL IN OT. DENIES
PAIN. SPEECH REMAINS MUMBLED/SLURRED AT TIMES GLORY WHEN TIRED. SOME
FORGETFULNESS NOTED. UP WITH 1 MOD ASSIST, GB AND WALKER. EATING 25-50% OF HER
MEAL. FREQ. VISUAL CHECKS. CALL LIGHT WITHIN REACH. FALL PRECAUTIONS IN PLACE

## 2020-08-16 VITALS — DIASTOLIC BLOOD PRESSURE: 70 MMHG | SYSTOLIC BLOOD PRESSURE: 143 MMHG

## 2020-08-16 VITALS — SYSTOLIC BLOOD PRESSURE: 118 MMHG | DIASTOLIC BLOOD PRESSURE: 57 MMHG

## 2020-08-16 NOTE — HC
[facility]
[address]
[place]   [address]                     CONSULTATION                  
_______________________________________________________________________________
 
Name:       PAZ MONTGOMERY              Room #:         512-P       [facility] [place]  
M.R.#:      [id number]                       Account #:      [id number]  
Admission:  08/10/20    Attend Phys:    Willy Shore MD 
Discharge:              Date of Birth:  [birth year]  
                                                          Report #: [id number]
                                                                    [id number]   
_______________________________________________________________________________
THIS REPORT FOR:  
 
cc:  Godfrey Mtz MD,Godfrey Andrade,Poli MITCHELL. PhD                                           ~
CC: Godfrey Shore
 
DATE OF SERVICE:  2020
 
 
NEUROBEHAVIORAL STATUS EXAM
 
ATTENDING PHYSICIAN:  Willy Shore MD
 
CONSULTANT:  Poli Andrade, PhD
 
AGE:  88.
 
CLINICAL PRESENTATION:  The patient is an 88-year-old female initially admitted
to the [facility] after a fall and right-sided weakness.  An MRI
confirmed an acute stroke in 4 small areas.  The patient also hit her head when
falling backwards at her home.  She has a history of previous cervical spinal
cord injury after falling off a bridge approximately 25 years ago and resultant
right-sided weakness.  Her medical history also includes cervical radiculopathy,
recurrent TIAs, hyperglycemia and right-sided weakness.
 
Her assessment on admission to the rehab unit are multiple small left
hemispheric CVA with right-sided weakness, chronic spinal cord myelomalacia,
previous cervical spinal cord injury with premorbid right weakness, type 2
diabetes, hypertension, depression, constipation and poor appetite.  A complete
description of her medical condition and history can be found in her medical
record.  Neuropsychological consultation was requested to provide assistance in
the assessment of cognitive and emotional status and provide recommendations and
services.
 
Prior to this most recent admission, the patient was residing in independent
living at a [facility] community named [facility].  She has 4 children. 
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
FPC.  Her  was an orthopedic surgeon an  from pancreatic
cancer.
 
TECHNIQUES UTILIZED:  Clinical interview, review of medical records, staff
consultation and behavioral observation, mini mental status exam 2 standard
version, and clock drawing.
 
 
 
 
[address]   [address]                     CONSULTATION                  
_______________________________________________________________________________
 
Name:       PAZ MONTGOMERY              Room #:         512-P       [facility] [place]  
.R.#:      [id number]                       Account #:      [id number]  
Admission:  08/10/20    Attend Phys:    Willy Shore MD 
Discharge:              Date of Birth:  [birth year]  
                                                          Report #: [id number]
                                                                    [id number]   
_______________________________________________________________________________
EXAMINATION FINDINGS:  The patient was very drowsy and lethargic during the
assessment.  It was difficult for her to maintain adequate alertness when
questioned.  She required frequent repetition to keep her eyes open and
attend to task.  Given the extreme drowsiness, the results of cognitive
testing are not likely to be accurate representation of her actual level of
functioning. 
 
She reports her symptoms to include sleep disturbance, difficulty with word
finding and variability during expressive speech.  Her mood is reported as
depressed.  She was uncertain about the extent of her appetite.
 
Her performance on the MMSE-2 brief version was extremely low with a raw score
of 9/16 with a T score of 4.  She was 3/3 for initial registration, 3/5 for
orientation to time, 3/5 for orientation to place and 0/3 for immediate recall
of 3 items after a brief time delay and distraction.
 
Performance on the MMSE-2 standard version was a raw score of 18/30.  She was
1/5 for serial 7's, 2/2 for naming, 1/1 for repetition, 3/3 for auditory
comprehension.  She could read and follow single command and write a sentence. 
The patient was unable to copy a simple geometric design or complete clock
drawing.
 
As indicated, her level of orientation and arousal was very poor and required
frequent encouragement to maintain alertness. She had insight into the
difficulty she was having in maintaining alertness
 
DIAGNOSTIC IMPRESSION:
 
Delirium, hypoactive, acute 
 
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
 
RECOMMENDATIONS:  She is presenting with severe daytime sleepiness. 
Currently she is taking Amitriptyline in the AM. Amitriptyline can be
exceptionally sedating. Consider discontinuing it, reducing the dose or taking
it at night if it is necessary for her treatment.
 
Her medical condition may also be contributing to the excessive
daytime sleepiness. Consider medication to improve wakefulness, e.g., Nuvigil.
 
The patient should be re-evaluated following an improvement in her level of
wakefulness.  Speech therapy report functioning to be within normal limits. 
However, current assessment because of delirium, shows cognitive disorder.
 
 
 
[address]   [address]                     CONSULTATION                  
_______________________________________________________________________________
 
Name:       PAZ MONTGOMERY              Room #:         512-P       [facility] [place]  
M.R.#:      [id number]                       Account #:      [id number]  
Admission:  08/10/20    Attend Phys:    Willy Shore MD 
Discharge:              Date of Birth:  [birth year]  
                                                          Report #: [id number]
                                                                    [id number]   
_______________________________________________________________________________
 
Thank you very much for allowing me to provide the consultation on this patient.
 
 
 
 
 
 
 
 
 
 
 
 
 
 
 
 
 
 
 
 
 
 
 
 
 
 
 
 
 
 
 
 
 
 
 
 
 
 
 
 
 
 
  <ELECTRONICALLY SIGNED>
   By: Poli Andrade, PhD           
  20     2037
D: 20 1546                           _____________________________________
T: 20 1627                           Poli Andrade, PhD             /nt

## 2020-08-16 NOTE — NUR
ASSUMED CARES AT 0700. PT SLEEPY/LETHARGIC/FATIGUED, ORIENTED*4. FORGETFUL. PT
FALLING ASLEEP BETWEEN MEALS, THERAPIES AND SOMETIMES DURING CONVERSATIONS
WITH STAFF. C/O FATIGUE, NAPS FOR 20-30MINS THEN WAKES UP. SPEECH SLURRED AND
MUMBLED DURING THIS SLEEP/FATIGUE EPISODES. PT EATING POORLY, <50% PER MEAL.
DENIES PAIN. VITALS REMAIN STABLE. CONTINUES TO HAVE RIGHT SIDED WEAKNESS,
DRAGGING RIGHT LEG WITH AMBULATION. UNABLE TO PARTICIPATE WELL IN PHYSICAL
THERAPY TODAY FELL ASLEEP STANDING AT THE SINK, WITH HEAD BOWED DOWN. PT UP
WITH 1 MIN-MOD ASSIST GB AND WALKER. FREQ. VISUAL CHECKS. CALL LIGHT WITHIN
REACH. FALL PRECAUTIONS IN PLACE

## 2020-08-16 NOTE — NUR
PT ALERT AND ORIENTED X 4, FORGETFUL.  AMB TO BR WITH WALKER AND ASSIST X 1.
DRAGS RIGHT FOOT.  BLOOD SUGAR 251 AT HS.  METFORMIN GIVEN.  PT DENIES PAIN OR
DISCOMFORT.  BED ALARM ON FOR SAFETY.  PT APPEARS TO BE SLEEPING ON HOURLY
ROUNDS.

## 2020-08-17 VITALS — DIASTOLIC BLOOD PRESSURE: 66 MMHG | SYSTOLIC BLOOD PRESSURE: 160 MMHG

## 2020-08-17 VITALS — DIASTOLIC BLOOD PRESSURE: 77 MMHG | SYSTOLIC BLOOD PRESSURE: 159 MMHG

## 2020-08-17 NOTE — NUR
PT ASSESSED AT START OF SHIFT. UP OUT OF BED MOST OF DAY. EATING AND DRINKING
WELL. MENTATION GOOD. NO FALLING ASLEEP NOTED. ELAVIL CHANGED TO HS. NO C/O
PAIN.

## 2020-08-17 NOTE — NUR
ASSUMED PT CARE AT 1900. PT IS A&OX4, BUT CAN BE SLOW TO RESPOND AT TIMES.
REPORTS NO PAIN. WALKED TO TOILET W/ WALKER AND MOD ASSIST. PT REFUSED SOME OF
HER STOOL SOFTENERS TONIGHT, ONLY WANTED CERTAIN ONCES DUE TO LOOSE STOOLS.
HAS DEPENDS ON FOR STRESS INCONTINENCE. CURRENTLY RESTING IN BED WITH EYES
CLOSED, WILL CONTINUE TO MONITOR.

## 2020-08-18 VITALS — DIASTOLIC BLOOD PRESSURE: 81 MMHG | SYSTOLIC BLOOD PRESSURE: 141 MMHG

## 2020-08-18 VITALS — SYSTOLIC BLOOD PRESSURE: 135 MMHG | DIASTOLIC BLOOD PRESSURE: 57 MMHG

## 2020-08-18 LAB
ANION GAP SERPL CALC-SCNC: 9 MMOL/L (ref 7–16)
ANISOCYTOSIS BLD QL SMEAR: SLIGHT
BASOPHILS NFR BLD AUTO: 1 % (ref 0–2)
BUN SERPL-MCNC: 17 MG/DL (ref 7–18)
CALCIUM SERPL-MCNC: 9.4 MG/DL (ref 8.5–10.1)
CHLORIDE SERPL-SCNC: 98 MMOL/L (ref 98–107)
CO2 SERPL-SCNC: 28 MMOL/L (ref 21–32)
CREAT SERPL-MCNC: 0.7 MG/DL (ref 0.6–1)
EOSINOPHIL NFR BLD: 7 % (ref 0–3)
ERYTHROCYTE [DISTWIDTH] IN BLOOD BY AUTOMATED COUNT: 13.9 % (ref 10.5–14.5)
GLUCOSE SERPL-MCNC: 175 MG/DL (ref 74–106)
GRANULOCYTES NFR BLD MANUAL: 64 % (ref 36–66)
HCT VFR BLD CALC: 32 % (ref 37–47)
HGB BLD-MCNC: 10.9 GM/DL (ref 12–15)
LYMPHOCYTES NFR BLD AUTO: 17 % (ref 24–44)
MAGNESIUM SERPL-MCNC: 1.8 MG/DL (ref 1.8–2.4)
MCH RBC QN AUTO: 32 PG (ref 26–34)
MCHC RBC AUTO-ENTMCNC: 33.9 G/DL (ref 28–37)
MCV RBC: 94.4 FL (ref 80–100)
MONOCYTES NFR BLD: 11 % (ref 1–8)
NEUTROPHILS # BLD: 4.6 THOU/UL (ref 1.4–8.2)
NEUTS BAND NFR BLD: 0 % (ref 0–8)
PLATELET # BLD: 337 THOU/UL (ref 150–400)
POTASSIUM SERPL-SCNC: 4.1 MMOL/L (ref 3.5–5.1)
RBC # BLD AUTO: 3.39 MIL/UL (ref 4.2–5)
SODIUM SERPL-SCNC: 135 MMOL/L (ref 136–145)
WBC # BLD AUTO: 7.2 THOU/UL (ref 4–11)

## 2020-08-18 NOTE — NUR
ASSUMED CARES AT 0700. PT AWAKE, ALERT AND ORIENTED*4 BUT FORGETFUL. PT MORE
AWAKE AND ALERT THAN PREVIOUSLY NOTED. ABLE TO COMMUNICATE MORE EFFECTIVELY
AND CLEAR AND LETS HER NEEDS KNOWN. DENIES PAIN AT THIS TIME. VITALS REMAIN
STABLE. PT CONTINUES TO HAVE RIGHT SIDED WEAKNESS, CONTINUES TO DRAG RIGHT
FOOT WITH AMBULATION. EATING 50-75% PER MEAL TODAY. CONTINUES TO HAVE STRESS
INCONTINENCE, URINE IS LIGHT YELLOW AND CLEAR WITH NO FOUL ODOR. PT UP WITH 1
MIN ASSIST, GB AND WALKER AND TOLERATED WELL. Q1H VISUAL CHECKS. CALL LIGHT
WITHIN REACH

## 2020-08-18 NOTE — NUR
team meeting, recommendation: [facility], ( pt, ot, st,
nursing). not safe with 4ww will need fww.

## 2020-08-18 NOTE — NUR
ASSUMED PT CARE AT 1900.PT WAS OBSERVED SITTING IN THE RECLINER IN HER ROOM
WATCHING TV.PT DENIED PIAN SO FAR. UP WITHS SBA /GAIT BELT AND WALKER TO THE
BSC.PT REF HER MIRALAX AND LACTULOSE AT HS.R SIDED WKNESS NOTED.BG
MONITORED,METFORMIN GIVEN.PT SLEEPING COMFORTABLY ON HER BED AT THIS TIME.FALL
PRECAUTIONJS IN PLACE,CALL LIGHT WITHIN REACH.

## 2020-08-19 VITALS — SYSTOLIC BLOOD PRESSURE: 138 MMHG | DIASTOLIC BLOOD PRESSURE: 77 MMHG

## 2020-08-19 VITALS — SYSTOLIC BLOOD PRESSURE: 147 MMHG | DIASTOLIC BLOOD PRESSURE: 86 MMHG

## 2020-08-19 VITALS — DIASTOLIC BLOOD PRESSURE: 54 MMHG | SYSTOLIC BLOOD PRESSURE: 93 MMHG

## 2020-08-19 NOTE — NUR
DID NOT ASK FOR HER ROUTINE EVENING MEDS. WEARING BRIEF FOR HEAVY USI.
UP TO BSC TO VOID WITH 1P ASSIST. LAXATIVES GIVEN SINCE NO BM TODAY, HER
ROUTINE IS 4 SENNAKOT EVERY HS. 2 TYLENOL FOR LEFT THUMB PAIN, IS SLEEPING
NOW

## 2020-08-19 NOTE — NUR
PT ALERT XS 4 ASSISTED TO BATHROOM USES ROLLING WALKER AND GAIT BELT. HAD MED
BM TOOK AM MEDS AND WAS GIVEN INSULIN AS PER ORDERS. LUNGS CTA NO COUGH NO
RESP DISTRESS. BS'S XS 4. INCONT OF URINE WEARS BRIEFS. PT PLEASANT AND
COOPERATIVE WITH CARE.

## 2020-08-19 NOTE — NUR
PT UP IN BEDSIDE CHAIR AWAITING DINNER STATES NO PAIN OR RESP DISTRESS. PT IS
PLEASANT AND COOPERATIVE. PT DID ALL OF HER THERAPIES TODAY. HAD 1 BM INCONT
EPISODE COULD NOT GET TO TOILET SOON ENOUGH.

## 2020-08-20 VITALS — SYSTOLIC BLOOD PRESSURE: 122 MMHG | DIASTOLIC BLOOD PRESSURE: 67 MMHG

## 2020-08-20 VITALS — DIASTOLIC BLOOD PRESSURE: 64 MMHG | SYSTOLIC BLOOD PRESSURE: 140 MMHG

## 2020-08-20 NOTE — NUR
FAXED UPDATE/REFERRAL TO [facility] FOR KIMMIE AT DISCHARGE PT IS NOW LIVING
IN THEIR [facility] DP TO FOLLOW.

## 2020-08-20 NOTE — NUR
DECLINED LACTULOSE, MIRALAX, AND COLACE AT HS LAST EVENING. DID TAKE 4 TABLETS
OF SENNEKOT, WHICH IS WHAT SHE USUALLY TAKES EVERY NIGHT AT HOME. NOW HAVING
LARGE BROWN SOFT FORMED BM, UNABLE TO FULLY CONTROL DURING WALK TO BATHROOM

## 2020-08-20 NOTE — NUR
ASSESSMENT AS CHARTED.  MEDS AS PER MAR -  SEEN BY THERAPY TODAY.  NAVEEN DIET
AND FLUIDS  UP WITH THE USE OF A WALKER ,  MAKES MULTPLE TRIPS TO THE
RESTROOM.   PT UNSTEADY ON FEET AND NOT BALANCED WITH THE USE OF THE WALKER.
UP IN THE CHAIR FOR MOST OF THE DAY.  NO CO'S AT THE PRESENT TIME.

## 2020-08-20 NOTE — NUR
UP TO TOILET WITH GAIT BELT, WALKER, AND CONTACT GUARD ASSIST. LARGE BM
EARLIER 8/19 AND SMEAR IN THE EVENING. CONTINUES TO MANAGE USI WITH BRIEF AND
PAD. LIGHTLY DRAGS RIGHT FOOT, SKIMMING ACROSS FLAT FLOOR, DISCUSSION
CONCERNING THAT SHE MIGHT HAVE TROUBLE WITH UNEVEN SURFACES BECAUSE SHE IS
UNABLE TO  FOOT AND DOES NOT LIKE TO USE AFO's

## 2020-08-20 NOTE — NUR
PT ALERT AND ORIENTED X 4, FORGETFUL.  PT DID CALL FOR HS MEDS APPROPRIATELY.
AMB TO BR WITH WALKER AND ASSIST X 1.  VERY UNSTEADY GAIT.  REQUIRES FREQUENT
CUES FOR SAFETY.  PT DENIES PAIN OR DISCOMFORT.  BED ALARM ON FOR SAFETY.  PT
APPEARS TO BE SLEEPING ON HOURLY ROUNDS.

## 2020-08-21 VITALS — SYSTOLIC BLOOD PRESSURE: 135 MMHG | DIASTOLIC BLOOD PRESSURE: 78 MMHG

## 2020-08-21 VITALS — DIASTOLIC BLOOD PRESSURE: 72 MMHG | SYSTOLIC BLOOD PRESSURE: 164 MMHG

## 2020-08-21 NOTE — PLAN
[facility]
[address]
[place]   [address]                     REHAB UNIT PLAN OF CARE       
_______________________________________________________________________________
 
Name:       PAZ MONTGOMERY              Room #:         512-P       ADM IN  
M.R.#:      [id number]                       Account #:      [id number]  
Admission:  08/10/20    Attend Phys:    Willy Shore MD 
Discharge:              Date of Birth:  [birth year]  
                                                          Report #: [id number]
                                                                    [id number]   
_______________________________________________________________________________
THIS REPORT FOR:   //name//                          
 
CC: Godfrey Shore
 
DATE OF SERVICE:  08/12/2020
 
 
PROGRESS NOTE AND OVERALL PLAN CARE
 
SUBJECTIVE:  The patient is seen back today in followup.  She is alert,
pleasant.  Last recorded temperature 97.6, pulse 80, respirations 20, and blood
pressure 144/80.  She is working in therapies with transfers mod assist, gait
max assist 20 feet with a front-wheeled walker.  In occupational therapy, lower
body dressing is min assist.  She is also following with speech therapy and has
moderate memory deficits with mild-to-moderate cognitive deficits.
 
ASSESSMENT:
1.  Multiple small left hemispheric cerebrovascular accidents with right-sided
weakness.
2.  Chronic spinal cord myelomalacia.
3.  Previous cervical spinal cord injury with premorbid right sided weakness.
4.  Type 2 diabetes mellitus.
5.  Hypertension.
6.  Depression.
7.  Constipation.
 
PLAN:  The overall plan of care is based on the preadmission screen,
post-admission physician evaluation and information garnered from therapy
assessments.
1.  Estimated length of stay is probably at least 2-3 weeks.
2.  Medical prognosis is reasonably good.
3.  Anticipated interventions include the interdisciplinary acute inpatient
rehabilitation program.
4.  Anticipated functional outcomes would be for the patient to become modified
independent with transfers, mobility, ADLs and improved cognition, so that she
can hopefully return back to her prior living situation or more likely to an
assisted living facility.
5.  Discharge destination is as noted above.
6.  Expected therapy by discipline includes PT, OT and speech 1 hour per day
each five days a week throughout the duration of the acute inpatient
rehabilitation stay.
 
 
  <ELECTRONICALLY SIGNED>
   By: Willy Shore MD         
  08/21/20     0923
D: 08/12/20 0909                           _____________________________________
T: 08/12/20 1556                           Willy Shore MD           /nt

## 2020-08-21 NOTE — H
[facility]
[address]
[place]   [address]                     HISTORY AND PHYSICAL          
_______________________________________________________________________________
 
Name:       PAZ MONTGOMERY              Room #:         512-P       ADM IN  
M.R.#:      [id number]                       Account #:      [id number]  
Admission:  08/10/20    Attend Phys:    Willy Shore MD 
Discharge:              Date of Birth:  [birth year]  
                                                          Report #: [id number]
                                                                    [id number]   
_______________________________________________________________________________
THIS REPORT FOR:  
 
cc:  Godfrey Mtz MD,Godfrey Shore,Willy TITUS MD                                         ~
CC: Godfrey Shore
 
DATE OF SERVICE:  08/10/2020
 
 
POSTADMISSION PHYSICIAN EVALUATION
 
HISTORY OF PRESENT ILLNESS:  Please see my consult note from yesterday and the
history and physical documentation from today.  The patient has a prior history
of an old spinal cord trauma with residual right-sided weakness, who was
admitted now with a new onset stroke with further right-sided weakness.  She was
noted to have an acute CVA with 4 small CVAs, left thalamic as well as adjacent
white matter.  She now has the complication of further weakness from the new
stroke.  She also underwent an MRI scan of her cervical spine, which revealed
severe neural foraminal stenosis at C5-C6 and some old thinning of the cervical
spinal cord at C6-C7 with some changes suggestive of myelomalacia.  The new CVA
complicates the prior spinal cord injury with residual paresis.  She has been
admitted for acute in-hospital inpatient rehabilitation.
 
PAST MEDICAL HISTORY:  Her prior history includes the prior cervical spinal cord
injury.  She has a history of back pain, depression, diabetes mellitus and
hypertension.
 
Please see the full documentation regarding social history and habits.
 
MEDICATIONS:  Please see the full MAR.
 
ALLERGIES:  MEPERIDINE.
 
REVIEW OF SYSTEMS:  No chest pain, shortness of breath or abdominal discomfort.
 
PHYSICAL EXAMINATION:
GENERAL:  The patient was seen earlier, was in no distress.
VITAL SIGNS:  Last recorded temperature 98, pulse 82, respirations 20, blood
pressure 154/74.  Alert, pleasant.  Facies appeared to be symmetric.
HEENT:  Appeared to be benign.
CHEST:  Sounded clear to auscultation.
CARDIOVASCULAR:  Regular rate and rhythm.
ABDOMEN:  Bowel sounds positive, nontender.
GENITOURINARY AND RECTAL:  Deferred.
 
 
 
[facility]
[address]
[place]   [address]                     HISTORY AND PHYSICAL          
_______________________________________________________________________________
 
Name:       PAZ MONTGOMERY              Room #:         512-P       [facility] [place]  
M.R.#:      [id number]                       Account #:      [id number]  
Admission:  08/10/20    Attend Phys:    Willy Shore MD 
Discharge:              Date of Birth:  [birth year]  
                                                          Report #: [id number]
                                                                    [id number]   
_______________________________________________________________________________
EXTREMITIES:  No lower extremity edema.  Functionally decreased coordination of
the right upper extremity with strength grade 4-/5.  Lower extremity on the
right appears to be weaker, probably a grade 3+/5.  She appears to have
functional range of motion and strength of the left upper and left lower
extremity.  As far as the right ankle, there was no clonus and no significant
DTR at the knee.  Sensation was reasonably intact to simultaneous stimulation
except for some decreased sensation of her right third digit.  Some of this
sounds premorbid.  She has been min assist sit to stand and ambulate short
distances with a front-wheeled walker.
 
ASSESSMENT:  An 88-year-old white female with the following problem list:
1.  Multiple small cerebrovascular accidents in the left cerebral hemisphere.
2.  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
appears old.
3.  Right-sided weakness, some appears to be premorbid, now complicated by the
new strokes.
4.  Functional mobility, ADLs and cognitive concerns.
5.  Diabetes mellitus.
6.  Hypertension.
7.  Depression.
 
PLAN:  The patient is admitted for an acute in-hospital inpatient rehabilitation
stay.  From a postadmission physician evaluation perspective, there are no
relevant changes since the preadmission screening.  Please see the above review
of prior and current medical and functional conditions and comorbidities. 
Please see the patient's previous and current functional status.  As far as risk
of complication, she has multiple medical comorbidities as noted above.  Initial
plan of care involves the interdisciplinary acute inpatient rehabilitation
program.  Measurable functional goals would be for the patient to become
modified independent with transfers, mobility, ADLs at a walker level to
hopefully achieve her prior functional level.  Also considering looking at
assisted living options depending on how she does.  Prognosis is reasonably good
with estimated length of stay probably at least 2-3 weeks.  Potential barriers
would include her multiple medical comorbidities and decreased functional
status.
 
 
 
 
 
 
 
 
 
  <ELECTRONICALLY SIGNED>
   By: Willy Shore MD         
  08/21/20     0923
D: 08/11/20 1204                           _____________________________________
T: 08/11/20 1259                           Willy Shore MD           /nt

## 2020-08-21 NOTE — NUR
ASSUMED CARES AT 0700. PT AWAKE, ALERT AND ORIENTED*4 BUT FORGETFUL. DENIES
PAIN. HR ELEVATED THIS AM, ALL OTHER VITALS REMAIN STABLE. PT UP WITH 1
MIN-MOD ASSIST, GB AND WALKER, PT CONTINUES TO DRAG HER RIGHT LEG WITH
AMBULATION. SOMETIMES LEG ZAYNAB AND PT HAS TO SIT DOWN SUDDENLY. Q1H VISUAL
CHECKS. CALL LIGHT WITHIN REACH. FALL PRECAUTIONS IN PLACE

## 2020-08-22 VITALS — SYSTOLIC BLOOD PRESSURE: 127 MMHG | DIASTOLIC BLOOD PRESSURE: 51 MMHG

## 2020-08-22 VITALS — SYSTOLIC BLOOD PRESSURE: 128 MMHG | DIASTOLIC BLOOD PRESSURE: 51 MMHG

## 2020-08-22 NOTE — NUR
ASSUMED CARE OF PT AT 0700. PT IS A&OX4 AND VITAL SIGNS ARE STABLE. PT DENIES
PAIN AND PARTICIPATED IN THERAPIES. ACCU CHECKS ACHS AND MANAGED PER ORDERS.
PT CALLED APPROPRIATELY FOR MEDICATIONS. FALL PRECAUTIONS IN PLACE AND NURSING
WILL CONTINUE TO MONITOR.

## 2020-08-22 NOTE — NUR
ASSUMED PT CARE AROUND 1930. AXOX4 WITH [place]. ELECVATED BP NOTED, REPORTED TO
NP ON CALL FOR HIMS. COREG STARTED. NO S/S ACUTE DISTRESS NOTED OR REPORTED AT
THIS TIME. WILL CONT TO MONITOR FOR ANY CHANGES IN CONDITION.

## 2020-08-23 VITALS — DIASTOLIC BLOOD PRESSURE: 55 MMHG | SYSTOLIC BLOOD PRESSURE: 135 MMHG

## 2020-08-23 VITALS — SYSTOLIC BLOOD PRESSURE: 141 MMHG | DIASTOLIC BLOOD PRESSURE: 74 MMHG

## 2020-08-23 NOTE — NUR
ASSUMED CARE OF PT AT 0715. PT IS A&OX4. IS [place]. IS ON ROOM AIR. IS STABLE.
REPORTS LOWER BACK PAIN 1/10. PT WOULD LIKE TO SPEAK WITH DOCTOR CONCERNING
MEDICATION FOR ARTHRITIS. OTHER THERAPUETIC TECHNIQUES PROVIDED. IS UP WITH 1
ASSIST, GB, WALKER. FALL PRECAUTIONS & HOURLY ROUNDING CONTINUED THIS SHIFT.
LABS & VITALS REVIEWED. PT HAS ORAL RINSE IN MED ROOM THAT SHE WOULD LIKE TO
CONTINUE TO USE. WOULD LIKE THAT OKAY BY DOCTOR. PT IS CURRENTLY UP IN
RECLINER. CALL LIGHT WITHIN REACH. WILL CONTINUE TO MONITOR.

## 2020-08-23 NOTE — NUR
assumed care at approx 1900 evening 8/22. pt alert and oriented x4 sitting up
in recliner at change of shift reading a book. pt pleasant and cooperative. pt
called out for hs meds and given. pt had bm before falling asleep. pt appears
to be sleeping soundly with hourly rounding. bed alarm on and call light in
reach. will continue to monitor.

## 2020-08-24 VITALS — SYSTOLIC BLOOD PRESSURE: 128 MMHG | DIASTOLIC BLOOD PRESSURE: 58 MMHG

## 2020-08-24 VITALS — SYSTOLIC BLOOD PRESSURE: 142 MMHG | DIASTOLIC BLOOD PRESSURE: 66 MMHG

## 2020-08-24 NOTE — NUR
cm called spoke with pt daughter franc via phone call " mom going to stay in
[place] at [facility] until [facility] opens up. going to have 24hr cg from [facility] and [facility] can do [facility]. thank you for calling"/franc.

## 2020-08-24 NOTE — NUR
SOLO PT CARE AROUND 1930. AXOX4. [place]. VSS. NO S/S ACUTE DISTRESS NOTED OR
REPORTED AT THIS TIME. CARE TRANSFERRED TO ANOTHER RN AT THIS TIME.

## 2020-08-24 NOTE — NUR
RECIEVED CARE OF THIS PATIENT AT 1900.  UP TO BATHROOM WITH ASSIST OF ONE,
GAIT BELT AND WALKER.  NEEDS REMINDING TO STAND TALL AND CLOSE TO THE WALKER.
DENIES PAIN.  ACCUCHECK , NO COVERAGE ORDERED.  SLEPT MOST OF NIGHT.

## 2020-08-24 NOTE — NUR
ASSUMED CARES AT 0700. PT ORIENTED*4. DENIES PAIN. VITALS REMAIN STABLE. PT
CONTINUES TO HAVE RIGHT SIDED WEAKNESS AND FOOT DRAG WITH AMBULATION. NOTED TO
BUCKLE HER LEGS WHEN FATIGUED AFTER WALKING FROM CHAIR TO THE BATHROOM. PT
CONTINUES TO HAVE STRESS INCONTINENCE. PLANS ARE FOR DC TOMORROW TO
[facility]DEPENDENT LIVING WITH 24/7 CARE UNTIL DAUGHTER IS ABLE TO ARRANGE TRANSFER
TO ASSISTED LIVING. COVID TEST DONE, AWAITING RESULTS. PT PARTICIPATED IN ALL
THERAPIES. Q1H VISUAL CHECKS. CALL LIGHT WITHIN REACH. FALL PRECAUTIONS IN
PLACE

## 2020-08-25 VITALS — SYSTOLIC BLOOD PRESSURE: 156 MMHG | DIASTOLIC BLOOD PRESSURE: 69 MMHG

## 2020-08-25 VITALS — DIASTOLIC BLOOD PRESSURE: 69 MMHG | SYSTOLIC BLOOD PRESSURE: 156 MMHG

## 2020-08-25 LAB
ANION GAP SERPL CALC-SCNC: 7 MMOL/L (ref 7–16)
BASOPHILS NFR BLD AUTO: 0.7 % (ref 0–2)
BUN SERPL-MCNC: 14 MG/DL (ref 7–18)
CALCIUM SERPL-MCNC: 8.8 MG/DL (ref 8.5–10.1)
CHLORIDE SERPL-SCNC: 100 MMOL/L (ref 98–107)
CO2 SERPL-SCNC: 29 MMOL/L (ref 21–32)
CREAT SERPL-MCNC: 0.7 MG/DL (ref 0.6–1)
EOSINOPHIL NFR BLD: 7.6 % (ref 0–3)
ERYTHROCYTE [DISTWIDTH] IN BLOOD BY AUTOMATED COUNT: 13.5 % (ref 10.5–14.5)
GLUCOSE SERPL-MCNC: 160 MG/DL (ref 74–106)
GRANULOCYTES NFR BLD MANUAL: 66.8 % (ref 36–66)
HCT VFR BLD CALC: 28.3 % (ref 37–47)
HGB BLD-MCNC: 9.6 GM/DL (ref 12–15)
LYMPHOCYTES NFR BLD AUTO: 16.8 % (ref 24–44)
MAGNESIUM SERPL-MCNC: 2 MG/DL (ref 1.8–2.4)
MCH RBC QN AUTO: 32 PG (ref 26–34)
MCHC RBC AUTO-ENTMCNC: 33.9 G/DL (ref 28–37)
MCV RBC: 94.3 FL (ref 80–100)
MONOCYTES NFR BLD: 8.1 % (ref 1–8)
NEUTROPHILS # BLD: 5.4 THOU/UL (ref 1.4–8.2)
PLATELET # BLD: 436 THOU/UL (ref 150–400)
POTASSIUM SERPL-SCNC: 4.2 MMOL/L (ref 3.5–5.1)
RBC # BLD AUTO: 3 MIL/UL (ref 4.2–5)
SODIUM SERPL-SCNC: 136 MMOL/L (ref 136–145)
WBC # BLD AUTO: 8.1 THOU/UL (ref 4–11)

## 2020-08-25 NOTE — NUR
[facility] called they can accept for hh needs at [place] today. pt will have
[facility] as  well set up by pt daughter. family will transport her back
to [facility] today. [place] order to be fax to  [facility].

## 2020-08-25 NOTE — NUR
PT DISCHARGING TODAY TO [facility] [place] FAXED REFERRAL TO [facility] WHICH
IS THE HH THEY USE AT FACILITY RECEIVED CONFIRMATION AND WILL F/U WITH THEM IN
THE AM.

## 2020-08-25 NOTE — NUR
ASSUMED PT CARE AT AROUND 2300HRS. PT IS [place]. REQUIRES MAX ASSIST TO THE BSC,
HAD A LARGE BM, MOSTLY WITH INCONTINENCE. PT DENIES PAIN. SHE IS NICE AND
COOPERATIVE WITH CARES. AFEBRILE.ROOM AIR WITH NO SIGNS OF SOA. FALL PREC IN
PLACE.

## 2020-08-25 NOTE — NUR
PT ALERT AND ORIENTED TIMES FOUR. VSS. PT DENIES PAIN/SOA. PT TOLERATES MEDS
AND MEALS. PT WORKED WELL WITH PT/OT TODAY. PT UP IN THE CHAIR FOR MOST OF THE
DAY. PT PLANS TO DISCHARGE TODAY. PT PROGRESSING TOWRADS POC GOALS.
